# Patient Record
(demographics unavailable — no encounter records)

---

## 2017-08-06 NOTE — EDPHY
H & P


Time Seen by Provider: 08/06/17 11:51


HPI/ROS: 





CHIEF COMPLAINT:  M1 hold, paranoid schizophrenia noncompliant with medication





HISTORY OF PRESENT ILLNESS:  49-year-old female presents to the emergency 

department with [facility] on M1 hold.  Patient has a history of 

paranoid schizophrenia and bipolar and has been noncompliant with medication.  

Per the [place] [employer], the patient was pounding on her neighbor's 

door last night as well as today screaming for help.  They tried to find her 

last night but she could not be found and then same thing happened again this 

morning and she was brought to the emergency department for evaluation.  She 

states that she is hearing voices and she feels suicidal.  She does not report 

a plan.  Currently has no physical complaints.





REVIEW OF SYSTEMS:


Constitutional:  No fever, no chills.


Eyes:  No double or blurry vision.


ENT:  No sore throat.


Respiratory:  No cough, no shortness of breath.


Cardiac:  No chest pain.


Gastrointestinal:  No abdominal pain, vomiting or diarrhea.


Genitourinary:  No dysuria.


Musculoskeletal:  No neck or back pain.


Skin:  No rashes.


Neurological:  No headache. (Manasa Roberson)


Past Medical/Surgical History: 





Bipolar, paranoid schizophrenia (Manasa Roberson)


Social History: 





Single


 (Manasa Roberson)


Physical Exam: 





General Appearance:  Alert, no distress.


Eyes:  Pupils equal and round.  Extraocular motions are all intact.


ENT:  Mouth:  Mucous membranes moist.


Respiratory:  No wheezing, rhonchi, or rales, lungs are clear to auscultation.


Cardiovascular:  Regular rate and rhythm.


Gastrointestinal:  Abdomen is soft and nontender, no masses, no rebound or 

guarding, bowel sounds normal.


Neurological:  Uncooperative, cannot determine.


Skin:  Warm and dry, no rashes.


Musculoskeletal:  Nontender to palpate along the cervical, thoracic or lumbar 

spine.  Neck is supple.


Extremities:  Full range of motion and no peripheral edema.


Psychiatric:  Mild agitation. (Manasa Roberson)


Constitutional: 


 Initial Vital Signs











Temperature (C)  36.5 C   08/06/17 11:41


 


Heart Rate  88   08/06/17 11:41


 


Respiratory Rate  24 H  08/06/17 11:41


 


Blood Pressure  123/76 H  08/06/17 11:41


 


O2 Sat (%)  95   08/06/17 11:41








 











O2 Delivery Mode               Room Air














Allergies/Adverse Reactions: 


 





No Known Allergies Allergy (Verified 02/24/14 04:14)


 











Medical Decision Making


ED Course/Re-evaluation: 





49-year-old female presents to the emergency department on M1 hold.  She has a 

history of paranoid schizophrenia and apparently has been noncompliant with 

medication.  Laboratory studies reveal reveal complete blood cell count was 

slightly elevated white blood cell count.  Chemistries are normal. Urinalysis 

was positive for amphetamines.





Patient does not the take prescribed Adderall.  Because she is positive for 

methamphetamines, the patient will be evaluated by mental health in 12 hours, 2:

00 a.m. at the earliest. (Manasa Roberson)





7:00 a.m.-I assumed care of this patient at shift change.  He has a history of 

schizoaffective disorder and presents with acute psychosis.  He has been seen 

by mental health and mental health is looking for inpatient disposition.





3pm--accepted to [facility].  EMTALA form completed. (Moni Solorio)


Differential Diagnosis: 





Psychosis including but not limited to hypoglycemia, infectious process, 

electrolyte abnormality, head injury and intoxicants. (Manasa Roberson)


Other Provider: 





Supervised Da PINEDA in the initial evaluation of this patient.  Methamphetamine 

positive, but history of primary psychiatric disorder.  Signed out to Ronal[facility] at 

2300 with plan for psych evaluation. (Arturo Son)


Care Turn Over: 





Care will be turned over to Dr. Son for disposition and plan.


 (Manasa Roberson)





2200  Care assumed by me from Dr Son pending mental health evaluation.





0700  patient has been evaluated in the bath health evaluator.  They are 

looking for placement.  Patient signed out to Dr. Solorio pending final 

disposition. (Addy Higgins)





- Data Points


Laboratory Results: 


 Laboratory Results





 08/06/17 11:55 





 08/06/17 11:55 








Medications Given: 


 








Discontinued Medications





Lorazepam (Ativan)  1 mg PO EDNOW ONE


   Stop: 08/06/17 12:04


   Last Admin: 08/06/17 12:09 Dose:  1 mg


Lorazepam (Ativan)  1 mg PO EDNOW ONE


   Stop: 08/07/17 13:10


   Last Admin: 08/07/17 13:20 Dose:  1 mg


Nicotine (Nicoderm Cq)  14 mg TD EDNOW ONE


   Stop: 08/07/17 13:10


   Last Admin: 08/07/17 13:38 Dose:  Not Given


Nicotine (Nicoderm Cq)  21 mg TD EDNOW ONE


   Stop: 08/07/17 13:17


   Last Admin: 08/07/17 13:20 Dose:  21 mg


Olanzapine (Olanzapine)  10 mg PO ONCE ONE


   Stop: 08/06/17 16:01


   Last Admin: 08/06/17 16:09 Dose:  Not Given


Olanzapine (Zyprexa Zydis)  10 mg PO EDNOW ONE


   Stop: 08/06/17 16:07


   Last Admin: 08/06/17 16:06 Dose:  10 mg








Departure





- Departure


Disposition: Other Psych, Not [facility]


Clinical Impression: 


 Acute psychosis





Schizophrenia


Qualifiers:


 Schizophrenia type: unspecified Qualified Code(s): F20.9 - Schizophrenia, 

unspecified





Condition: Good


Instructions:  Schizophrenia (ED)


Referrals: 


Enid Chisholm PA [Non Staff and Non MD] - As per Instructions